# Patient Record
Sex: FEMALE | Race: WHITE | Employment: FULL TIME | URBAN - NONMETROPOLITAN AREA
[De-identification: names, ages, dates, MRNs, and addresses within clinical notes are randomized per-mention and may not be internally consistent; named-entity substitution may affect disease eponyms.]

---

## 2021-09-21 ENCOUNTER — HOSPITAL ENCOUNTER (EMERGENCY)
Age: 41
Discharge: HOME | End: 2021-09-21
Attending: EMERGENCY MEDICINE

## 2021-09-21 VITALS
WEIGHT: 176 LBS | BODY MASS INDEX: 34.55 KG/M2 | SYSTOLIC BLOOD PRESSURE: 136 MMHG | RESPIRATION RATE: 18 BRPM | TEMPERATURE: 98.3 F | DIASTOLIC BLOOD PRESSURE: 94 MMHG | HEIGHT: 60 IN | HEART RATE: 98 BPM | OXYGEN SATURATION: 99 %

## 2021-09-21 DIAGNOSIS — K04.7 DENTAL INFECTION: Primary | ICD-10-CM

## 2021-09-21 PROCEDURE — 99283 EMERGENCY DEPT VISIT LOW MDM: CPT

## 2021-09-21 RX ORDER — GLIMEPIRIDE 2 MG/1
TABLET ORAL
COMMUNITY
Start: 2021-09-14

## 2021-09-21 RX ORDER — ESCITALOPRAM OXALATE 10 MG/1
10 TABLET ORAL DAILY
COMMUNITY
Start: 2021-09-14

## 2021-09-21 RX ORDER — DOXEPIN HYDROCHLORIDE 50 MG/1
50 CAPSULE ORAL 2 TIMES DAILY
COMMUNITY
Start: 2021-09-14

## 2021-09-21 RX ORDER — CLINDAMYCIN HYDROCHLORIDE 300 MG/1
300 CAPSULE ORAL 3 TIMES DAILY
Qty: 21 CAPSULE | Refills: 0 | Status: SHIPPED | OUTPATIENT
Start: 2021-09-21 | End: 2021-09-28

## 2021-09-21 RX ORDER — CETIRIZINE HYDROCHLORIDE 10 MG/1
TABLET ORAL
COMMUNITY
Start: 2021-09-14

## 2021-09-21 RX ORDER — HYDROXYZINE 50 MG/1
TABLET, FILM COATED ORAL
COMMUNITY
Start: 2021-09-14

## 2021-09-21 RX ORDER — LISINOPRIL 20 MG/1
TABLET ORAL
COMMUNITY
Start: 2021-09-14

## 2021-09-21 RX ORDER — ARIPIPRAZOLE 5 MG/1
TABLET ORAL
COMMUNITY
Start: 2021-09-14

## 2021-09-21 ASSESSMENT — PAIN DESCRIPTION - PAIN TYPE: TYPE: ACUTE PAIN

## 2021-09-21 ASSESSMENT — ENCOUNTER SYMPTOMS
VOICE CHANGE: 0
FACIAL SWELLING: 1

## 2021-09-21 ASSESSMENT — PAIN SCALES - GENERAL: PAINLEVEL_OUTOF10: 4

## 2021-09-21 ASSESSMENT — PAIN DESCRIPTION - LOCATION: LOCATION: TEETH

## 2021-09-21 ASSESSMENT — PAIN DESCRIPTION - ORIENTATION: ORIENTATION: LEFT;LOWER

## 2021-09-21 NOTE — ED PROVIDER NOTES
Triage Chief Complaint:   Dental Pain (lower left dental pain x 3 days; sts broken molar; does not have dentist)    [place]:  Flakito Tsang is a 42 y.o. female that presents to the ED complaining of tooth pain is been present for about 72 hours. She broke a tooth she has full upper dentures. She does not have a local dentist.  She lives locally does not know where to go. No reported fever no exposure but with Covid.   She is having some cold intolerance and swelling to the left side of her face  HPI    Past Medical History:   Diagnosis Date    Bipolar affective ([address]     Full dentures     Full upper denture--partial lower denture    History of blood transfusion 5/2015    Anemia from prolonged menses    Hypertension     Type II or unspecified type diabetes mellitus without mention of complication, not stated as uncontrolled [facility])      Past Surgical History:   Procedure Laterality Date    DENTAL SURGERY  2007    Most of teeth extracted    DILATION AND CURETTAGE OF UTERUS  2/2015    HERNIA REPAIR  43/99/22    Umbilical     Family History   Problem Relation Age of Onset    High Blood Pressure Mother     Diabetes Father     Vision Loss Father     High Blood Pressure Father     Other Sister         Migraines     Social History     Socioeconomic History    Marital status:      Spouse name: Not on file    Number of children: Not on file    Years of education: Not on file    Highest education level: Not on file   Occupational History    Not on file   Tobacco Use    Smoking status: Never Smoker    Smokeless tobacco: Never Used   Substance and Sexual Activity    Alcohol use: Yes     Comment: occassionally    Drug use: No    Sexual activity: Yes     Partners: Male   Other Topics Concern    Not on file   Social History Narrative    Not on file     Social Determinants of Health     Financial Resource Strain:     Difficulty of Paying Living Expenses:    Food Insecurity:     Worried About Circumference       Peak Flow       Pain Score       Pain Loc       Pain Edu? Excl. in [address]? Physical Exam  Vitals and nursing note reviewed. Constitutional:       Appearance: She is well-developed. She is obese. She is ill-appearing. HENT:      Head: Normocephalic and atraumatic. Mouth/Throat:      Lips: Pink. Mouth: Mucous membranes are moist.      Dentition: Abnormal dentition. Has dentures. Dental tenderness, gingival swelling, dental caries and gum lesions present. Eyes:      Pupils: Pupils are equal, round, and reactive to light. Musculoskeletal:         General: Normal range of motion. Cervical back: Normal range of motion and neck supple. Skin:     General: Skin is warm and dry. Neurological:      Mental Status: She is alert and oriented to person, place, and time. I have reviewed and interpreted all of the currently available lab results from this visit (ifapplicable):  No results found for this visit on 09/21/21. Radiographs (if obtained):  [] The following radiograph wasinterpreted by myself in the absence of a radiologist:   [] Radiologist's Report Reviewed:  No orders to display         EKG (if obtained): (All EKG's are interpreted by myself in the absence of a cardiologist)    Chart review shows recent radiographs:  No results found. MDM:      Patient has severe dental disease caries gingivitis with probable periapical abscess that is not externally draining or bulging. She does not require an ideal starting clindamycin stat she is to call to be seen with the next 24 hours           Clinical Impression:  1. Dental infection      Disposition referral (if applicable):    Dentist        Disposition medications (if applicable):  New Prescriptions    CLINDAMYCIN (CLEOCIN) 300 MG CAPSULE    Take 1 capsule by mouth 3 times daily for 7 days           Al March DO, FACEP      Comment: Please note this report has been produced using speech recognition software and maycontain errors related to that system including errors in grammar, punctuation, and spelling, as well as words and phrases that may be inappropriate. If there are any questions or concerns please feel free to contact thedictating provider for clarification.         Jarred Durbin,   09/21/21 1046

## 2021-09-21 NOTE — ED NOTES
This nurse entered patients room to review discharge paperwork, patient was not in room, appears patient left without discharge instructions.      Chintan Delcid RN  09/21/21 6140

## 2021-11-14 ENCOUNTER — HOSPITAL ENCOUNTER (EMERGENCY)
Age: 41
Discharge: HOME | End: 2021-11-14
Attending: EMERGENCY MEDICINE
Payer: COMMERCIAL

## 2021-11-14 VITALS
HEART RATE: 111 BPM | TEMPERATURE: 97.6 F | HEIGHT: 60 IN | OXYGEN SATURATION: 98 % | DIASTOLIC BLOOD PRESSURE: 88 MMHG | SYSTOLIC BLOOD PRESSURE: 144 MMHG | RESPIRATION RATE: 18 BRPM | BODY MASS INDEX: 33.38 KG/M2 | WEIGHT: 170 LBS

## 2021-11-14 DIAGNOSIS — K04.7 DENTAL ABSCESS: Primary | ICD-10-CM

## 2021-11-14 PROCEDURE — 99283 EMERGENCY DEPT VISIT LOW MDM: CPT

## 2021-11-14 RX ORDER — IBUPROFEN 800 MG/1
800 TABLET ORAL EVERY 6 HOURS PRN
Qty: 30 TABLET | Refills: 0 | Status: SHIPPED | OUTPATIENT
Start: 2021-11-14 | End: 2021-12-10

## 2021-11-14 RX ORDER — CLINDAMYCIN HYDROCHLORIDE 300 MG/1
300 CAPSULE ORAL 4 TIMES DAILY
Qty: 40 CAPSULE | Refills: 0 | Status: SHIPPED | OUTPATIENT
Start: 2021-11-14 | End: 2021-11-24

## 2021-11-14 ASSESSMENT — PAIN DESCRIPTION - DESCRIPTORS: DESCRIPTORS: ACHING

## 2021-11-14 ASSESSMENT — PAIN DESCRIPTION - PAIN TYPE: TYPE: ACUTE PAIN

## 2021-11-14 ASSESSMENT — PAIN SCALES - GENERAL: PAINLEVEL_OUTOF10: 8

## 2021-11-14 ASSESSMENT — PAIN DESCRIPTION - LOCATION: LOCATION: JAW

## 2021-11-14 ASSESSMENT — PAIN DESCRIPTION - ORIENTATION: ORIENTATION: RIGHT;LOWER

## 2021-11-14 NOTE — ED PROVIDER NOTES
Emergency Department Encounter  Location: [address]    Patient: Delaney Mejia  MRN: [id number]  : 1980  Date of evaluation: 2021  ED Provider: Ike Vyas DO, FACEP    Chief Complaint:    Dental Pain (right lower jaw x 1 week )    [place]:  Delaney Mejia is a 42 y.o. female that presents to the emergency department with complaints of a dental abscess in her right lower jaw. The patient states that she has insurance now and is attempting to see the dental clinic at the [facility]. The patient was seen in September for similar problem. She states for the past week she has had some redness and swelling with pain in her right lower jaw. She states that when she was seen in September she tried to see a dentist but did not have insurance but has insurance now. She denies fever or chills. She denies any difficulty swallowing. She denies any other associated signs and symptoms. She has been using ibuprofen with minimal relief. ROS:  At least 4 systems reviewed and otherwise acutely negative except as in the [address]   Negative for fever or chills  Negative for chest pain  Negative for shortness of breath  Negative for nausea vomiting diarrhea or constipation    Past Medical History:   Diagnosis Date    Bipolar affective ([facility] [address]     Full dentures     Full upper denture--partial lower denture    History of blood transfusion 2015    Anemia from prolonged menses    Hypertension     Type II or unspecified type diabetes mellitus without mention of complication, not stated as uncontrolled      Past Surgical History:   Procedure Laterality Date    DENTAL SURGERY      Most of teeth extracted    DILATION AND CURETTAGE OF UTERUS  2015    HERNIA REPAIR  83    Umbilical     Family History   Problem Relation Age of Onset    High Blood Pressure Mother     Diabetes Father     Vision Loss Father     High Blood Pressure Father     Other Sister Migraines     Social History     Socioeconomic History    Marital status:      Spouse name: Not on file    Number of children: Not on file    Years of education: Not on file    Highest education level: Not on file   Occupational History    Not on file   Tobacco Use    Smoking status: Never Smoker    Smokeless tobacco: Never Used   Substance and Sexual Activity    Alcohol use: Yes     Comment: occassionally    Drug use: Yes     Types: Marijuana [place])    Sexual activity: Yes     Partners: Male   Other Topics Concern    Not on file   Social History Narrative    Not on file     Social Determinants of Health     Financial Resource Strain:     Difficulty of Paying Living Expenses: Not on file   Food Insecurity:     Worried About Running Out of Food in the Last Year: Not on file    Kody of Food in the Last Year: Not on file   Transportation Needs:     Lack of Transportation (Medical): Not on file    Lack of Transportation (Non-Medical): Not on file   Physical Activity:     Days of Exercise per Week: Not on file    Minutes of Exercise per Session: Not on file   Stress:     Feeling of Stress : Not on file   Social Connections:     Frequency of Communication with Friends and Family: Not on file    Frequency of Social Gatherings with Friends and Family: Not on file    Attends Anabaptism Services: Not on file    Active Member of [address] [place] [address] [employer] or Organizations: Not on file    Attends Club or Organization Meetings: Not on file    Marital Status: Not on file   Intimate Partner Violence:     Fear of Current or Ex-Partner: Not on file    Emotionally Abused: Not on file    Physically Abused: Not on file    Sexually Abused: Not on file   Housing Stability:     Unable to Pay for Housing in the Last Year: Not on file    Number of [place] in the Last Year: Not on file    Unstable Housing in the Last Year: Not on file     No current facility-administered medications for this encounter. Current Outpatient Medications   Medication Sig Dispense Refill    clindamycin (CLEOCIN) 300 MG capsule Take 1 capsule by mouth 4 times daily for 10 days 40 capsule 0    ibuprofen (ADVIL;MOTRIN) 800 MG tablet Take 1 tablet by mouth every 6 hours as needed for Pain 30 tablet 0    ARIPiprazole (ABILIFY) 5 MG tablet TAKE 1 TABLET BY MOUTH ONCE DAILY AT BEDTIME      cetirizine (ZYRTEC) 10 MG tablet TAKE 1 TABLET BY MOUTH ONCE DAILY      doxepin (SINEQUAN) 25 MG capsule TAKE 2 CAPSULES BY MOUTH EVERY DAY AT BEDTIME      escitalopram (LEXAPRO) 5 MG tablet TAKE 1 TABLET BY MOUTH ONCE DAILY      glimepiride (AMARYL) 2 MG tablet TAKE 1 TABLET BY MOUTH TWICE DAILY      hydrOXYzine (ATARAX) 50 MG tablet TAKE 2 TABLETS BY MOUTH AT BEDTIME      lisinopril (PRINIVIL;ZESTRIL) 20 MG tablet TAKE 1 TABLET BY MOUTH ONCE DAILY      metformin (GLUCOPHAGE) 500 MG tablet Take 1 tablet by mouth 2 times daily (with meals). 20 tablet 0     Allergies   Allergen Reactions    Mineral Oil Itching and Rash     Nursing Notes Reviewed    Physical Exam:  ED Triage Vitals [11/14/21 1503]   Enc Vitals Group      BP (!) 144/88      Pulse 111      Resp 18      Temp 97.6 °F (36.4 °C)      Temp Source Oral      SpO2 98 %      Weight 170 lb (77.1 kg)      Height 5' (1.524 m)      Head Circumference       Peak Flow       Pain Score       Pain Loc       Pain Edu? Excl. in [address]? GENERAL APPEARANCE: Awake and alert. Cooperative. No acute distress. Nontoxic in appearance  HEAD: Normocephalic. Atraumatic. EYES: Sclera anicteric. ENT: Tolerates saliva. Dentition is generally in poor repair with carious teeth present she does have swelling that is apparent externally along the angle of her jaw on the right. Upon examination of her oral cavity there is a carious tooth present in position #29. There is broken off and the pulp is exposed. She has what appears to be pulpitis associated with an apical abscess. NECK: Supple.  Trachea midline. LUNGS: Respirations unlabored. EXTREMITIES: No acute deformities. SKIN: Warm and dry. NEUROLOGICAL: No gross facial drooping. PSYCHIATRIC: Normal mood. Labs:  No results found for this visit on 11/14/21. Radiographs (if obtained):  [] The following radiograph was interpreted by myself in the absence of a radiologist:  [x] Radiologist's Report reviewed at time of ED visit:  No orders to display       ED Course and MDM:  Patient will be started on clindamycin and ibuprofen. I considered prednisone for this patient but she is diabetic and her blood sugars she states is normally around 170. She takes oral medication and is not able to perform a sliding scale so relative to that elected not to use prednisone. Patient was given the dental resource sheet and she will be discharged stable condition to follow-up with the dentist as soon as possible. Final Impression:  1.  Dental abscess      DISPOSITION Decision To Discharge    Patient referred to:  A dentist      As soon as possible    Discharge medications:  New Prescriptions    CLINDAMYCIN (CLEOCIN) 300 MG CAPSULE    Take 1 capsule by mouth 4 times daily for 10 days    IBUPROFEN (ADVIL;MOTRIN) 800 MG TABLET    Take 1 tablet by mouth every 6 hours as needed for Pain     (Please note that portions of this note may have been completed with a voice recognition program. Efforts were made to edit the dictations but occasionally words are mis-transcribed.)    Pebbles Carreno DO, [facility]  Board certified in [place], [address] [place] [address]  11/14/21 [address] [place] [address]

## 2021-12-10 ENCOUNTER — HOSPITAL ENCOUNTER (EMERGENCY)
Age: 41
Discharge: HOME | End: 2021-12-10
Payer: COMMERCIAL

## 2021-12-10 VITALS
RESPIRATION RATE: 18 BRPM | SYSTOLIC BLOOD PRESSURE: 137 MMHG | BODY MASS INDEX: 33.38 KG/M2 | HEART RATE: 115 BPM | TEMPERATURE: 97.5 F | DIASTOLIC BLOOD PRESSURE: 77 MMHG | WEIGHT: 170 LBS | HEIGHT: 60 IN | OXYGEN SATURATION: 98 %

## 2021-12-10 DIAGNOSIS — L03.211 FACIAL CELLULITIS: ICD-10-CM

## 2021-12-10 DIAGNOSIS — K02.9 DENTAL CARIES: ICD-10-CM

## 2021-12-10 DIAGNOSIS — K04.7 DENTAL INFECTION: Primary | ICD-10-CM

## 2021-12-10 PROCEDURE — 99282 EMERGENCY DEPT VISIT SF MDM: CPT

## 2021-12-10 RX ORDER — AMOXICILLIN AND CLAVULANATE POTASSIUM 875; 125 MG/1; MG/1
1 TABLET, FILM COATED ORAL 2 TIMES DAILY
Qty: 20 TABLET | Refills: 0 | Status: SHIPPED | OUTPATIENT
Start: 2021-12-10 | End: 2021-12-20

## 2021-12-10 RX ORDER — IBUPROFEN 600 MG/1
600 TABLET ORAL 3 TIMES DAILY PRN
Qty: 30 TABLET | Refills: 0 | Status: SHIPPED | OUTPATIENT
Start: 2021-12-10

## 2021-12-10 RX ORDER — ACETAMINOPHEN 325 MG/1
650 TABLET ORAL EVERY 6 HOURS PRN
Qty: 30 TABLET | Refills: 0 | Status: SHIPPED | OUTPATIENT
Start: 2021-12-10

## 2021-12-10 RX ORDER — AMOXICILLIN AND CLAVULANATE POTASSIUM 875; 125 MG/1; MG/1
1 TABLET, FILM COATED ORAL ONCE
Status: DISCONTINUED | OUTPATIENT
Start: 2021-12-10 | End: 2021-12-10 | Stop reason: HOSPADM

## 2021-12-10 NOTE — ED PROVIDER NOTES
CHIEF COMPLAINT  Chief Complaint   Patient presents with    Dental Pain     since yesterday       HPI  Ashvin Rodriguez is a 42 y.o. female with history of diabetes, partial denture on the lower, hypertension who presents 3 days of worsening right lower dental pain with swelling of the face for 1 day. She is also had fatigue. She denies measured fever. She denies vomiting. Denies difficulty swallowing, noisy breathing, pain or swelling underneath the tongue. She has swelling of the right lower face. She has area of fractured teeth in this region and plans on following with dentistry next Wednesday. Signs and symptoms moderate and persistent, no trauma to the area.       REVIEW OF SYSTEMS  Review of Systems   History obtained from chart review and the patient  General ROS: negative for - fever  Ophthalmic ROS: negative for - decreased vision or double vision  ENT ROS: positive for -dental pain, facial swelling  Hematological and Lymphatic ROS: negative for - bleeding problems or bruising  Endocrine ROS: negative for - unexpected weight changes  Respiratory ROS: no cough, shortness of breath, or wheezing  Cardiovascular ROS: no chest pain or dyspnea on exertion  Gastrointestinal ROS: no abdominal pain, change in bowel habits, or black or bloody stools  Genito-Urinary ROS: no dysuria, trouble voiding, or hematuria  Musculoskeletal ROS: negative for - joint stiffness or joint swelling  Neurological ROS: no TIA or stroke symptoms      PAST MEDICAL HISTORY  Past Medical History:   Diagnosis Date    B[address] affective ([facility] [address]     Full dentures     Full upper denture--partial lower denture    History of blood transfusion 5/2015    Anemia from prolonged menses    Hypertension     Type II or unspecified type diabetes mellitus without mention of complication, not stated as uncontrolled        FAMILY HISTORY  Family History   Problem Relation Age of Onset    High Blood Pressure Mother     Diabetes Father    Lawrence Muro Vision Loss Father     High Blood Pressure Father     Other Sister         Migraines       SOCIAL HISTORY  Social History     Socioeconomic History    Marital status:      Spouse name: None    Number of children: None    Years of education: None    Highest education level: None   Occupational History    None   Tobacco Use    Smoking status: Never Smoker    Smokeless tobacco: Never Used   Substance and Sexual Activity    Alcohol use: Yes     Comment: occassionally    Drug use: Yes     Types: Marijuana [place])    Sexual activity: Yes     Partners: Male   Other Topics Concern    None   Social History Narrative    None     Social Determinants of Health     Financial Resource Strain:     Difficulty of Paying Living Expenses: Not on file   Food Insecurity:     Worried About Running Out of Food in the Last Year: Not on file    Kody of Food in the Last Year: Not on file   Transportation Needs:     Lack of Transportation (Medical): Not on file    Lack of Transportation (Non-Medical):  Not on file   Physical Activity:     Days of Exercise per Week: Not on file    Minutes of Exercise per Session: Not on file   Stress:     Feeling of Stress : Not on file   Social Connections:     Frequency of Communication with Friends and Family: Not on file    Frequency of Social Gatherings with Friends and Family: Not on file    Attends Mandaeism Services: Not on file    Active Member of [employer] or Organizations: Not on file    Attends Club or Organization Meetings: Not on file    Marital Status: Not on file   Intimate Partner Violence:     Fear of Current or Ex-Partner: Not on file    Emotionally Abused: Not on file    Physically Abused: Not on file    Sexually Abused: Not on file   Housing Stability:     Unable to Pay for Housing in the Last Year: Not on file    Number of [place] in the Last Year: Not on file    Unstable Housing in the Last Year: Not on file       SURGICAL HISTORY  Past Surgical History:   Procedure Laterality Date    DENTAL SURGERY  2007    Most of teeth extracted    DILATION AND CURETTAGE OF UTERUS  2/2015    HERNIA REPAIR  11/00/53    Umbilical       CURRENT MEDICATIONS  No current facility-administered medications on file prior to encounter. Current Outpatient Medications on File Prior to Encounter   Medication Sig Dispense Refill    ARIPiprazole (ABILIFY) 5 MG tablet TAKE 1 TABLET BY MOUTH ONCE DAILY AT BEDTIME      cetirizine (ZYRTEC) 10 MG tablet TAKE 1 TABLET BY MOUTH ONCE DAILY      doxepin (SINEQUAN) 25 MG capsule TAKE 2 CAPSULES BY MOUTH EVERY DAY AT BEDTIME      escitalopram (LEXAPRO) 5 MG tablet TAKE 1 TABLET BY MOUTH ONCE DAILY      glimepiride (AMARYL) 2 MG tablet TAKE 1 TABLET BY MOUTH TWICE DAILY      hydrOXYzine (ATARAX) 50 MG tablet TAKE 2 TABLETS BY MOUTH AT BEDTIME      lisinopril (PRINIVIL;ZESTRIL) 20 MG tablet TAKE 1 TABLET BY MOUTH ONCE DAILY      metformin (GLUCOPHAGE) 500 MG tablet Take 1 tablet by mouth 2 times daily (with meals). 20 tablet 0         ALLERGIES  Allergies   Allergen Reactions    Mineral Oil Itching and Rash       PHYSICAL EXAM  VITAL SIGNS: /77   Pulse 115   Temp 97.5 °F (36.4 °C) (Oral)   Resp 18   Ht 5' (1.524 m)   Wt 170 lb (77.1 kg)   SpO2 98%   BMI 33.20 kg/m²   Constitutional: Well developed, Well nourished, resting in chair, appears uncomfortable  HENT: Normocephalic, Atraumatic, Bilateral external ears normal, Oropharynx moist, No oral exudates, Nose normal.  Swelling of the right lower face lateral to the jaw. Gingival induration without any fluctuance, fractured teeth. No sublingual or submandibular fullness or induration. Swallows without difficulty. TM intact right without any Dimitri Sam, mastoiditis or otitis externa. Eyes: PERRL, EOMI, Conjunctiva normal, No discharge. Neck: Normal range of motion, Supple, No stridor.    Cardiovascular: Mild tachycardia heart rate, Normal rhythm, No murmurs, No rubs, No gallops. Thorax & Lungs: Normal breath sounds, No respiratory distress, No wheezing, No chest tenderness. Abdomen: Bowel sounds normal, Soft, No tenderness, no guarding, no rebound, No masses, No pulsatile masses. Skin: Warm, Dry, No erythema, No rash. Extremities: Intact distal pulses, No edema, No tenderness, No cyanosis, No clubbing. Musculoskeletal: Good gross range of motion in all major joints. No major deformities noted. Neurologic: Alert & oriented x 3, Normal gross motor function, Normal gross sensory function, No focal deficits noted. Psychiatric: Affect normal        RADIOLOGY/PROCEDURES/LABS      Medications   amoxicillin-clavulanate (AUGMENTIN) 875-125 MG per tablet 1 tablet (has no administration in time range)       COURSE & MEDICAL DECISION MAKING  Pertinent Labs & Imaging studies reviewed. (See chart for details)    40-year-old female presents with dental fracture, dental carry with associated facial cellulitis. She will be started on Augmentin. She is not having significant pain here but does have pressure and swelling. There is not signs of Nilesh's angina, there is no drainable abscess at this time, is more consistent with induration. Patient's blood sugars have been in the 160s 170s, she is agreeable to watch these closely. She will be started on 10 days of Augmentin, referred back to dentistry, strict return precautions put in to place. Patient agreeable plan of care. \  FINAL IMPRESSION  Problem List Items Addressed This Visit     None      Visit Diagnoses     Dental infection    -  Primary    Dental caries        Facial cellulitis          1.    2.   3.    Patient gave me permission to discuss medical history, care, and plan with those present in the room.   Electronically signed by: Jhonny Tran MD, 12/10/2021  MD Jhonny Florentino MD  12/10/21 5862

## 2022-03-08 ENCOUNTER — HOSPITAL ENCOUNTER (EMERGENCY)
Age: 42
Discharge: PSYCHIATRIC OR SUBSTANCE USE FACILITY | End: 2022-03-09
Attending: EMERGENCY MEDICINE
Payer: COMMERCIAL

## 2022-03-08 DIAGNOSIS — R45.851 SUICIDAL IDEATION: Primary | ICD-10-CM

## 2022-03-08 LAB
ALBUMIN SERPL-MCNC: 4.3 GM/DL (ref 3.4–5)
ALCOHOL SCREEN SERUM: <0.01 %WT/VOL
ALP BLD-CCNC: 121 IU/L (ref 40–129)
ALT SERPL-CCNC: 50 U/L (ref 10–40)
AMPHETAMINES: NEGATIVE
ANION GAP SERPL CALCULATED.3IONS-SCNC: 11 MMOL/L (ref 4–16)
AST SERPL-CCNC: 29 IU/L (ref 15–37)
BACTERIA: ABNORMAL /HPF
BARBITURATE SCREEN URINE: NEGATIVE
BASOPHILS ABSOLUTE: 0 K/CU MM
BASOPHILS RELATIVE PERCENT: 0.3 % (ref 0–1)
BENZODIAZEPINE SCREEN, URINE: NEGATIVE
BILIRUB SERPL-MCNC: 0.5 MG/DL (ref 0–1)
BILIRUBIN URINE: NEGATIVE MG/DL
BLOOD, URINE: NEGATIVE
BUN BLDV-MCNC: 9 MG/DL (ref 6–23)
CALCIUM SERPL-MCNC: 8.8 MG/DL (ref 8.3–10.6)
CANNABINOID SCREEN URINE: NEGATIVE
CAST TYPE: ABNORMAL /HPF
CHLORIDE BLD-SCNC: 100 MMOL/L (ref 99–110)
CLARITY: CLEAR
CO2: 20 MMOL/L (ref 21–32)
COCAINE METABOLITE: NEGATIVE
COLOR: YELLOW
CREAT SERPL-MCNC: 0.6 MG/DL (ref 0.6–1.1)
CRYSTAL TYPE: NEGATIVE /HPF
DIFFERENTIAL TYPE: ABNORMAL
EOSINOPHILS ABSOLUTE: 0.1 K/CU MM
EOSINOPHILS RELATIVE PERCENT: 1 % (ref 0–3)
EPITHELIAL CELLS, UA: ABNORMAL /HPF
GFR AFRICAN AMERICAN: >60 ML/MIN/1.73M2
GFR NON-AFRICAN AMERICAN: >60 ML/MIN/1.73M2
GLUCOSE BLD-MCNC: 273 MG/DL (ref 70–99)
GLUCOSE, URINE: >1000 MG/DL
HCT VFR BLD CALC: 40.3 % (ref 37–47)
HEMOGLOBIN: 14.3 GM/DL (ref 12.5–16)
IMMATURE NEUTROPHIL %: 0.3 % (ref 0–0.43)
INTERPRETATION: NORMAL
KETONES, URINE: NEGATIVE MG/DL
LEUKOCYTE ESTERASE, URINE: NEGATIVE
LYMPHOCYTES ABSOLUTE: 1.6 K/CU MM
LYMPHOCYTES RELATIVE PERCENT: 21.4 % (ref 24–44)
MCH RBC QN AUTO: 28 PG (ref 27–31)
MCHC RBC AUTO-ENTMCNC: 35.5 % (ref 32–36)
MCV RBC AUTO: 78.9 FL (ref 78–100)
MONOCYTES ABSOLUTE: 0.5 K/CU MM
MONOCYTES RELATIVE PERCENT: 7.4 % (ref 0–4)
NITRITE URINE, QUANTITATIVE: NEGATIVE
OPIATES, URINE: NEGATIVE
OXYCODONE: NEGATIVE
PDW BLD-RTO: 12.7 % (ref 11.7–14.9)
PH, URINE: 5.5 (ref 5–8)
PHENCYCLIDINE, URINE: NEGATIVE
PLATELET # BLD: 150 K/CU MM (ref 140–440)
PMV BLD AUTO: 10.7 FL (ref 7.5–11.1)
POTASSIUM SERPL-SCNC: 3.9 MMOL/L (ref 3.5–5.1)
PREGNANCY, URINE: NEGATIVE
PROTEIN UA: ABNORMAL MG/DL
RBC # BLD: 5.11 M/CU MM (ref 4.2–5.4)
RBC URINE: ABNORMAL /HPF (ref 0–6)
SARS-COV-2, NAAT: NOT DETECTED
SEGMENTED NEUTROPHILS ABSOLUTE COUNT: 5.1 K/CU MM
SEGMENTED NEUTROPHILS RELATIVE PERCENT: 69.6 % (ref 36–66)
SODIUM BLD-SCNC: 131 MMOL/L (ref 135–145)
SOURCE: NORMAL
SPECIFIC GRAVITY UA: 1.02 (ref 1–1.03)
SPECIFIC GRAVITY, URINE: 1.02 (ref 1–1.03)
TOTAL IMMATURE NEUTOROPHIL: 0.02 K/CU MM
TOTAL PROTEIN: 7.5 GM/DL (ref 6.4–8.2)
UROBILINOGEN, URINE: 0.2 MG/DL (ref 0.2–1)
WBC # BLD: 7.3 K/CU MM (ref 4–10.5)
WBC UA: ABNORMAL /HPF (ref 0–5)

## 2022-03-08 PROCEDURE — 85025 COMPLETE CBC W/AUTO DIFF WBC: CPT

## 2022-03-08 PROCEDURE — 81025 URINE PREGNANCY TEST: CPT

## 2022-03-08 PROCEDURE — G0480 DRUG TEST DEF 1-7 CLASSES: HCPCS

## 2022-03-08 PROCEDURE — 99285 EMERGENCY DEPT VISIT HI MDM: CPT

## 2022-03-08 PROCEDURE — 99205 OFFICE O/P NEW HI 60 MIN: CPT | Performed by: PSYCHIATRY & NEUROLOGY

## 2022-03-08 PROCEDURE — 87635 SARS-COV-2 COVID-19 AMP PRB: CPT

## 2022-03-08 PROCEDURE — 81001 URINALYSIS AUTO W/SCOPE: CPT

## 2022-03-08 PROCEDURE — 6370000000 HC RX 637 (ALT 250 FOR IP): Performed by: EMERGENCY MEDICINE

## 2022-03-08 PROCEDURE — 80053 COMPREHEN METABOLIC PANEL: CPT

## 2022-03-08 PROCEDURE — 80307 DRUG TEST PRSMV CHEM ANLYZR: CPT

## 2022-03-08 RX ORDER — ZIPRASIDONE HYDROCHLORIDE 20 MG/1
20 CAPSULE ORAL 2 TIMES DAILY WITH MEALS
COMMUNITY

## 2022-03-08 RX ORDER — LISINOPRIL 20 MG/1
20 TABLET ORAL ONCE
Status: COMPLETED | OUTPATIENT
Start: 2022-03-08 | End: 2022-03-08

## 2022-03-08 RX ADMIN — LISINOPRIL 20 MG: 20 TABLET ORAL at 16:28

## 2022-03-08 NOTE — ED PROVIDER NOTES
Triage Chief Complaint:   Suicidal    [place]:  Maya Rothman is a 36 y.o. female that presents presents to the ED with symptoms that she is concerned she is having acute panic attack. Patient is suicidal with a plan to crash her car. She is  working to support a self with much difficulty she is having a challenging at work she has no access to handguns. She has attempted suicide in the past and has been hospitalized at [facility] a year ago. She used to be a cutter but has not done that recently. She does have a stepdaughter but she does not live with her. There is limited social support. The patient feels like she is at the end of the rope and needs to be hospitalized. She has been off her meds that have been filled for the [facility] consist of Abilify Lexapro Lamictal.  She is not taking those.         Past Medical History:   Diagnosis Date    Bipolar affective ([address]     Full dentures     Full upper denture--partial lower denture    History of blood transfusion 5/2015    Anemia from prolonged menses    Hypertension     Type II or unspecified type diabetes mellitus without mention of complication, not stated as uncontrolled      Past Surgical History:   Procedure Laterality Date    DENTAL SURGERY  2007    Most of teeth extracted    DILATION AND CURETTAGE OF UTERUS  2/2015    HERNIA REPAIR  29/14/87    Umbilical     Family History   Problem Relation Age of Onset    High Blood Pressure Mother     Diabetes Father     Vision Loss Father     High Blood Pressure Father     Other Sister         Migraines     Social History     Socioeconomic History    Marital status:      Spouse name: Not on file    Number of children: Not on file    Years of education: Not on file    Highest education level: Not on file   Occupational History    Not on file   Tobacco Use    Smoking status: Never Smoker    Smokeless tobacco: Never Used   Vaping Use    Vaping Use: Never used Substance and Sexual Activity    Alcohol use: Yes     Comment: occassionally    Drug use: Not Currently     Types: Marijuana Dolan Meckel)    Sexual activity: Yes     Partners: Male   Other Topics Concern    Not on file   Social History Narrative    Not on file     Social Determinants of Health     Financial Resource Strain:     Difficulty of Paying Living Expenses: Not on file   Food Insecurity:     Worried About Running Out of Food in the Last Year: Not on file    Kody of Food in the Last Year: Not on file   Transportation Needs:     Lack of Transportation (Medical): Not on file    Lack of Transportation (Non-Medical): Not on file   Physical Activity:     Days of Exercise per Week: Not on file    Minutes of Exercise per Session: Not on file   Stress:     Feeling of Stress : Not on file   Social Connections:     Frequency of Communication with Friends and Family: Not on file    Frequency of Social Gatherings with Friends and Family: Not on file    Attends [employer]ism Services: Not on file    Active Member of [address] [place] [address] [employer] or Organizations: Not on file    Attends Club or Organization Meetings: Not on file    Marital Status: Not on file   Intimate Partner Violence:     Fear of Current or Ex-Partner: Not on file    Emotionally Abused: Not on file    Physically Abused: Not on file    Sexually Abused: Not on file   Housing Stability:     Unable to Pay for Housing in the Last Year: Not on file    Number of [place] in the Last Year: Not on file    Unstable Housing in the Last Year: Not on file     No current facility-administered medications for this encounter.      Current Outpatient Medications   Medication Sig Dispense Refill    ziprasidone (GEODON) 20 MG capsule Take 20 mg by mouth 2 times daily (with meals)      acetaminophen (AMINOFEN) 325 MG tablet Take 2 tablets by mouth every 6 hours as needed for Pain 30 tablet 0    ibuprofen (ADVIL;MOTRIN) 600 MG tablet Take 1 tablet by mouth 3 times daily as needed for Pain 30 tablet 0    ARIPiprazole (ABILIFY) 5 MG tablet TAKE 1 TABLET BY MOUTH ONCE DAILY AT BEDTIME      cetirizine (ZYRTEC) 10 MG tablet TAKE 1 TABLET BY MOUTH ONCE DAILY      doxepin (SINEQUAN) 50 MG capsule Take 50 mg by mouth 2 times daily       escitalopram (LEXAPRO) 10 MG tablet Take 10 mg by mouth daily       glimepiride (AMARYL) 2 MG tablet TAKE 1 TABLET BY MOUTH TWICE DAILY      hydrOXYzine (ATARAX) 50 MG tablet TAKE 2 TABLETS BY MOUTH AT BEDTIME      lisinopril (PRINIVIL;ZESTRIL) 20 MG tablet TAKE 1 TABLET BY MOUTH ONCE DAILY      metformin (GLUCOPHAGE) 500 MG tablet Take 1 tablet by mouth 2 times daily (with meals). 20 tablet 0     Allergies   Allergen Reactions    Mineral Oil Itching and Rash         ROS:    Review of Systems   Psychiatric/Behavioral: Positive for dysphoric mood, sleep disturbance and suicidal ideas. All other systems reviewed and are negative. Nursing Notes Reviewed    Physical Exam:      ED Triage Vitals [03/08/22 1602]   Enc Vitals Group      BP (!) 142/105      Pulse 86      Resp 18      Temp 98.1 °F (36.7 °C)      Temp Source Oral      SpO2 98 %      Weight 180 lb (81.6 kg)      Height 5' (1.524 m)      Head Circumference       Peak Flow       Pain Score       Pain Loc       Pain Edu? Excl. in [address]? Physical Exam  Vitals and nursing note reviewed. Exam conducted with a chaperone present. Constitutional:       Appearance: She is well-developed. She is obese. HENT:      Head: Normocephalic and atraumatic. Right Ear: External ear normal.      Left Ear: External ear normal.   Eyes:      General: No scleral icterus. Right eye: No discharge. Left eye: No discharge. Conjunctiva/sclera: Conjunctivae normal.      Pupils: Pupils are equal, round, and reactive to light. Neck:      Thyroid: No thyromegaly. Vascular: No JVD. Trachea: No tracheal deviation.    Cardiovascular:      Rate and Rhythm: Normal rate and radiographs:  No results found. MDM:      Patient presents ED with a suicidal plan to crash in a car. The patient was seen tearful profoundly depressed warrants emergent psychiatric hospitalization. She is medically cleared and pending placement        ED Course as of 03/08/22 1735   Tue Mar 08, 2022   1627 Case discussed with psychiatry Dr. Belle Masters. He agrees the patient needs to be admitted he recommends contacting [phone] [facility] access to initiate the process [PW]      ED Course User Index  [PW] Josep Ramos DO         Clinical Impression:  1. Suicidal ideation      Disposition referral (if applicable):  No follow-up provider specified. Disposition medications (if applicable):  New Prescriptions    No medications on file           Al March DO, FACEP      Comment: Please note this report has been produced using speech recognition software and maycontain errors related to that system including errors in grammar, punctuation, and spelling, as well as words and phrases that may be inappropriate. If there are any questions or concerns please feel free to contact thedictating provider for clarification.         Josep Ramos DO  03/08/22 9207
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
## 2022-03-09 VITALS
DIASTOLIC BLOOD PRESSURE: 88 MMHG | BODY MASS INDEX: 35.34 KG/M2 | HEIGHT: 60 IN | RESPIRATION RATE: 18 BRPM | TEMPERATURE: 98.4 F | HEART RATE: 65 BPM | SYSTOLIC BLOOD PRESSURE: 126 MMHG | OXYGEN SATURATION: 97 % | WEIGHT: 180 LBS

## 2022-03-09 NOTE — ED NOTES
This tech took over sitting bedside with patient. Patient is resting in bed, no complaints of discomfort.           Golden  03/09/22 8419

## 2022-03-09 NOTE — ED PROVIDER NOTES
ADDENDUM:    Care of the patient was assumed  from Dr. Piter Ferguson. I have reviewed the notes, assessments, and/or procedures performed, I concur with her/his documentation on [address]. I reviewed the medical record and evaluated the patient. ED COURSE/MDM:  Laboratory and imaging data were reviewed and care plan was arranged with the patient(see separate lab/imaging reports). RADIOLOGY:  Already resulted studies have been reviewed. No orders to display       Labs Reviewed   CBC WITH AUTO DIFFERENTIAL - Abnormal; Notable for the following components:       Result Value    Segs Relative 69.6 (*)     Lymphocytes % 21.4 (*)     Monocytes % 7.4 (*)     All other components within normal limits   COMPREHENSIVE METABOLIC PANEL W/ REFLEX TO MG FOR LOW K - Abnormal; Notable for the following components:    Sodium 131 (*)     CO2 20 (*)     Glucose 273 (*)     ALT 50 (*)     All other components within normal limits   URINALYSIS WITH MICROSCOPIC - Abnormal; Notable for the following components:    Glucose, Urine >1,000 (*)     Protein, UA TRACE (*)     Bacteria, UA RARE (*)     All other components within normal limits   COVID-19, RAPID   PREGNANCY, URINE   ETHANOL   URINE DRUG SCREEN       Medications   lisinopril (PRINIVIL;ZESTRIL) tablet 20 mg (20 mg Oral Given 3/8/22 1628)       Vitals:    03/08/22 1602   BP: (!) 142/105   Pulse: 86   Resp: 18   Temp: 98.1 °F (36.7 °C)   TempSrc: Oral   SpO2: 98%   Weight: 180 lb (81.6 kg)   Height: 5' (1.524 m)       Patient accepted and going to [facility] 32:  1.  Suicidal ideation        New Prescriptions    No medications on file                 Marcia Davis DO  03/09/22 5569

## 2023-08-02 ENCOUNTER — TELEPHONE (OUTPATIENT)
Dept: CARDIOLOGY CLINIC | Age: 43
End: 2023-08-02

## 2023-08-23 ENCOUNTER — HOSPITAL ENCOUNTER (EMERGENCY)
Age: 43
Discharge: HOME | End: 2023-08-23
Attending: EMERGENCY MEDICINE

## 2023-08-23 VITALS
TEMPERATURE: 98.6 F | WEIGHT: 150 LBS | DIASTOLIC BLOOD PRESSURE: 86 MMHG | BODY MASS INDEX: 29.45 KG/M2 | OXYGEN SATURATION: 97 % | HEART RATE: 100 BPM | HEIGHT: 60 IN | RESPIRATION RATE: 18 BRPM | SYSTOLIC BLOOD PRESSURE: 137 MMHG

## 2023-08-23 DIAGNOSIS — L02.91 ABSCESS: Primary | ICD-10-CM

## 2023-08-23 PROCEDURE — 10060 I&D ABSCESS SIMPLE/SINGLE: CPT

## 2023-08-23 PROCEDURE — 99283 EMERGENCY DEPT VISIT LOW MDM: CPT

## 2023-08-23 RX ORDER — SULFAMETHOXAZOLE AND TRIMETHOPRIM 800; 160 MG/1; MG/1
1 TABLET ORAL 2 TIMES DAILY
Qty: 10 TABLET | Refills: 0 | Status: SHIPPED | OUTPATIENT
Start: 2023-08-23 | End: 2023-08-28

## 2023-08-23 ASSESSMENT — PAIN - FUNCTIONAL ASSESSMENT: PAIN_FUNCTIONAL_ASSESSMENT: 0-10

## 2023-08-23 ASSESSMENT — PAIN SCALES - GENERAL: PAINLEVEL_OUTOF10: 9

## 2023-08-24 NOTE — DISCHARGE INSTRUCTIONS
Drink lots of water. Tylenol 1g (2 extra strength tabs) and 600mg Motrin (aka ibuprofen) every 8 hours for pain. Antibiotics as we discussed. Follow up with your doctor in 2 days for reevaluation. Return for worsening or concerning symptoms.

## 2023-08-28 NOTE — ED PROVIDER NOTES
CC: abscess  Seen in room 8    HPI: This is a 39 y.o. female with past medical history of DM who comes for evaluation of abscess in the scalp in the back of her head. No fevers or chills. No neck pain. Notes she has had pain in her scalp on the top of her head and wanted to get checked out. Nursing Notes Reviewed    Past Medical History:   Diagnosis Date    Bipolar affective ([address])     Full dentures     Full upper denture--partial lower denture    History of blood transfusion 5/2015    Anemia from prolonged menses    Hypertension     Type II or unspecified type diabetes mellitus without mention of complication, not stated as uncontrolled      Past Surgical History:   Procedure Laterality Date    DENTAL SURGERY  2007    Most of teeth extracted    DILATION AND CURETTAGE OF UTERUS  2/2015    HERNIA REPAIR  15/67/69    Umbilical     Social History     Socioeconomic History    Marital status:      Spouse name: Not on file    Number of children: Not on file    Years of education: Not on file    Highest education level: Not on file   Occupational History    Not on file   Tobacco Use    Smoking status: Never    Smokeless tobacco: Never   Vaping Use    Vaping Use: Never used   Substance and Sexual Activity    Alcohol use: Yes     Comment: occassionally    Drug use: Not Currently     Types: Marijuana Ariadna Rival)    Sexual activity: Yes     Partners: Male   Other Topics Concern    Not on file   Social History Narrative    Not on file     Social Determinants of Health     Financial Resource Strain: Not on file   Food Insecurity: Not on file   Transportation Needs: Not on file   Physical Activity: Not on file   Stress: Not on file   Social Connections: Not on file   Intimate Partner Violence: Not on file   Housing Stability: Not on file     Allergies   Allergen Reactions    Mineral Oil Itching and Rash       Physical exam:  Vitals: per triage note  General: awake, alert. No acute distress. Skin: No rashes noted.   HENT:

## 2023-09-26 ENCOUNTER — HOSPITAL ENCOUNTER (EMERGENCY)
Age: 43
Discharge: HOME | End: 2023-09-26
Attending: EMERGENCY MEDICINE
Payer: COMMERCIAL

## 2023-09-26 VITALS
BODY MASS INDEX: 29.45 KG/M2 | RESPIRATION RATE: 16 BRPM | HEIGHT: 60 IN | OXYGEN SATURATION: 100 % | SYSTOLIC BLOOD PRESSURE: 160 MMHG | HEART RATE: 80 BPM | DIASTOLIC BLOOD PRESSURE: 103 MMHG | TEMPERATURE: 97.9 F | WEIGHT: 150 LBS

## 2023-09-26 DIAGNOSIS — K04.7 DENTAL INFECTION: Primary | ICD-10-CM

## 2023-09-26 PROCEDURE — 99283 EMERGENCY DEPT VISIT LOW MDM: CPT

## 2023-09-26 RX ORDER — AMOXICILLIN 500 MG/1
500 CAPSULE ORAL 3 TIMES DAILY
Qty: 21 CAPSULE | Refills: 0 | Status: SHIPPED | OUTPATIENT
Start: 2023-09-26 | End: 2023-10-03

## 2023-09-26 ASSESSMENT — LIFESTYLE VARIABLES
HOW MANY STANDARD DRINKS CONTAINING ALCOHOL DO YOU HAVE ON A TYPICAL DAY: 1 OR 2
HOW OFTEN DO YOU HAVE A DRINK CONTAINING ALCOHOL: MONTHLY OR LESS

## 2023-09-26 ASSESSMENT — PAIN DESCRIPTION - LOCATION: LOCATION: MOUTH

## 2023-09-26 ASSESSMENT — PAIN SCALES - GENERAL: PAINLEVEL_OUTOF10: 7

## 2023-09-26 ASSESSMENT — PAIN - FUNCTIONAL ASSESSMENT: PAIN_FUNCTIONAL_ASSESSMENT: 0-10

## 2023-09-26 ASSESSMENT — ENCOUNTER SYMPTOMS: FACIAL SWELLING: 0

## 2023-09-26 ASSESSMENT — PAIN DESCRIPTION - ORIENTATION: ORIENTATION: LEFT;LOWER

## 2023-09-26 ASSESSMENT — PAIN DESCRIPTION - DESCRIPTORS: DESCRIPTORS: BURNING

## 2023-09-26 NOTE — ED PROVIDER NOTES
Triage Chief Complaint:   Dental Pain (C/o lower right dental pain since last Friday. Patient states she has a dentist appt next Thursday. Patient has taken advil for pain but no relief.)    GERARD Scanlon is a 39 y.o. female that presents to the ED with complaint dental pain she had complete extraction to her maxillary dentition but is having pain on the inner aspect of the lower mandibular region. Is been present for several days she states she is waiting to have dental implants pain began on Friday she has been taking ibuprofen.   No fever no chills some cold intolerance denies any drainage or bleeding        Past Medical History:   Diagnosis Date    Bipolar affective ([address])     Full dentures     Full upper denture--partial lower denture    History of blood transfusion 5/2015    Anemia from prolonged menses    Hypertension     Type II or unspecified type diabetes mellitus without mention of complication, not stated as uncontrolled      Past Surgical History:   Procedure Laterality Date    DENTAL SURGERY  2007    Most of teeth extracted    DILATION AND CURETTAGE OF UTERUS  2/2015    HERNIA REPAIR  03/77/64    Umbilical     Family History   Problem Relation Age of Onset    High Blood Pressure Mother     Diabetes Father     Vision Loss Father     High Blood Pressure Father     Other Sister         Migraines     Social History     Socioeconomic History    Marital status:      Spouse name: Not on file    Number of children: Not on file    Years of education: Not on file    Highest education level: Not on file   Occupational History    Not on file   Tobacco Use    Smoking status: Never    Smokeless tobacco: Never   Vaping Use    Vaping Use: Never used   Substance and Sexual Activity    Alcohol use: Yes     Comment: occassionally    Drug use: Not Currently     Types: Marijuana Melodie Key)    Sexual activity: Yes     Partners: Male   Other Topics Concern    Not on file   Social History Narrative    Not on

## 2023-09-26 NOTE — ED NOTES
Patient discharging home, AVS reviewed with no questions at this time. Patient instrcuted to follow up per discharge instructions and to return for worsening symptoms. Respirations equal and unlabored, skin PWD.        Ruby Maher RN  09/26/23 1720

## 2023-11-04 ENCOUNTER — HOSPITAL ENCOUNTER (EMERGENCY)
Age: 43
Discharge: HOME | End: 2023-11-05
Attending: EMERGENCY MEDICINE
Payer: COMMERCIAL

## 2023-11-04 VITALS
OXYGEN SATURATION: 100 % | HEIGHT: 60 IN | SYSTOLIC BLOOD PRESSURE: 140 MMHG | WEIGHT: 160 LBS | RESPIRATION RATE: 12 BRPM | DIASTOLIC BLOOD PRESSURE: 100 MMHG | TEMPERATURE: 98.8 F | HEART RATE: 99 BPM | BODY MASS INDEX: 31.41 KG/M2

## 2023-11-04 DIAGNOSIS — L03.811 CELLULITIS OF HEAD EXCEPT FACE: ICD-10-CM

## 2023-11-04 DIAGNOSIS — L02.91 ABSCESS: Primary | ICD-10-CM

## 2023-11-04 PROCEDURE — 96365 THER/PROPH/DIAG IV INF INIT: CPT

## 2023-11-04 PROCEDURE — 99283 EMERGENCY DEPT VISIT LOW MDM: CPT

## 2023-11-04 PROCEDURE — 96375 TX/PRO/DX INJ NEW DRUG ADDON: CPT

## 2023-11-04 RX ORDER — CEPHALEXIN 500 MG/1
500 CAPSULE ORAL 4 TIMES DAILY
Qty: 40 CAPSULE | Refills: 0 | Status: SHIPPED | OUTPATIENT
Start: 2023-11-04 | End: 2023-11-14

## 2023-11-04 RX ORDER — CEPHALEXIN 500 MG/1
500 CAPSULE ORAL ONCE
Status: COMPLETED | OUTPATIENT
Start: 2023-11-05 | End: 2023-11-05

## 2023-11-04 RX ORDER — LIDOCAINE HYDROCHLORIDE 10 MG/ML
10 INJECTION, SOLUTION EPIDURAL; INFILTRATION; INTRACAUDAL; PERINEURAL ONCE
Status: DISCONTINUED | OUTPATIENT
Start: 2023-11-04 | End: 2023-11-05 | Stop reason: HOSPADM

## 2023-11-04 RX ORDER — BACITRACIN ZINC 500 [USP'U]/G
OINTMENT TOPICAL ONCE
Status: COMPLETED | OUTPATIENT
Start: 2023-11-05 | End: 2023-11-05

## 2023-11-04 ASSESSMENT — PAIN SCALES - GENERAL: PAINLEVEL_OUTOF10: 9

## 2023-11-04 ASSESSMENT — PAIN - FUNCTIONAL ASSESSMENT: PAIN_FUNCTIONAL_ASSESSMENT: 0-10

## 2023-11-04 ASSESSMENT — PAIN DESCRIPTION - LOCATION: LOCATION: HEAD

## 2023-11-05 VITALS
BODY MASS INDEX: 30.23 KG/M2 | OXYGEN SATURATION: 98 % | HEIGHT: 60 IN | HEART RATE: 101 BPM | RESPIRATION RATE: 14 BRPM | DIASTOLIC BLOOD PRESSURE: 104 MMHG | WEIGHT: 154 LBS | TEMPERATURE: 97.6 F | SYSTOLIC BLOOD PRESSURE: 139 MMHG

## 2023-11-05 DIAGNOSIS — E86.0 DEHYDRATION: ICD-10-CM

## 2023-11-05 DIAGNOSIS — Z51.89 WOUND CHECK, ABSCESS: Primary | ICD-10-CM

## 2023-11-05 DIAGNOSIS — E87.6 HYPOKALEMIA: ICD-10-CM

## 2023-11-05 LAB
ANION GAP SERPL CALCULATED.3IONS-SCNC: 16 MMOL/L (ref 4–16)
BASOPHILS ABSOLUTE: 0.1 K/CU MM
BASOPHILS RELATIVE PERCENT: 0.5 % (ref 0–1)
BUN SERPL-MCNC: 6 MG/DL (ref 6–23)
CALCIUM SERPL-MCNC: 9.1 MG/DL (ref 8.3–10.6)
CHLORIDE BLD-SCNC: 95 MMOL/L (ref 99–110)
CO2: 21 MMOL/L (ref 21–32)
CREAT SERPL-MCNC: 0.5 MG/DL (ref 0.6–1.1)
DIFFERENTIAL TYPE: ABNORMAL
EOSINOPHILS ABSOLUTE: 0.1 K/CU MM
EOSINOPHILS RELATIVE PERCENT: 1.2 % (ref 0–3)
GFR SERPL CREATININE-BSD FRML MDRD: >60 ML/MIN/1.73M2
GLUCOSE SERPL-MCNC: 345 MG/DL (ref 70–99)
HCT VFR BLD CALC: 45.3 % (ref 37–47)
HEMOGLOBIN: 16 GM/DL (ref 12.5–16)
IMMATURE NEUTROPHIL %: 0.3 % (ref 0–0.43)
LYMPHOCYTES ABSOLUTE: 1.6 K/CU MM
LYMPHOCYTES RELATIVE PERCENT: 15 % (ref 24–44)
MCH RBC QN AUTO: 28.7 PG (ref 27–31)
MCHC RBC AUTO-ENTMCNC: 35.3 % (ref 32–36)
MCV RBC AUTO: 81.2 FL (ref 78–100)
MONOCYTES ABSOLUTE: 1 K/CU MM
MONOCYTES RELATIVE PERCENT: 9.1 % (ref 0–4)
PDW BLD-RTO: 13.1 % (ref 11.7–14.9)
PLATELET # BLD: 182 K/CU MM (ref 140–440)
PMV BLD AUTO: 10.7 FL (ref 7.5–11.1)
POTASSIUM SERPL-SCNC: 3.2 MMOL/L (ref 3.5–5.1)
RBC # BLD: 5.58 M/CU MM (ref 4.2–5.4)
SEGMENTED NEUTROPHILS ABSOLUTE COUNT: 7.9 K/CU MM
SEGMENTED NEUTROPHILS RELATIVE PERCENT: 73.9 % (ref 36–66)
SODIUM BLD-SCNC: 132 MMOL/L (ref 135–145)
TOTAL IMMATURE NEUTOROPHIL: 0.03 K/CU MM
WBC # BLD: 10.6 K/CU MM (ref 4–10.5)

## 2023-11-05 PROCEDURE — 96365 THER/PROPH/DIAG IV INF INIT: CPT

## 2023-11-05 PROCEDURE — 96375 TX/PRO/DX INJ NEW DRUG ADDON: CPT

## 2023-11-05 PROCEDURE — 99284 EMERGENCY DEPT VISIT MOD MDM: CPT

## 2023-11-05 PROCEDURE — 10060 I&D ABSCESS SIMPLE/SINGLE: CPT

## 2023-11-05 PROCEDURE — 6370000000 HC RX 637 (ALT 250 FOR IP): Performed by: EMERGENCY MEDICINE

## 2023-11-05 PROCEDURE — 80048 BASIC METABOLIC PNL TOTAL CA: CPT

## 2023-11-05 PROCEDURE — 2580000003 HC RX 258: Performed by: EMERGENCY MEDICINE

## 2023-11-05 PROCEDURE — 85025 COMPLETE CBC W/AUTO DIFF WBC: CPT

## 2023-11-05 PROCEDURE — 6360000002 HC RX W HCPCS: Performed by: EMERGENCY MEDICINE

## 2023-11-05 RX ORDER — KETOROLAC TROMETHAMINE 15 MG/ML
15 INJECTION, SOLUTION INTRAMUSCULAR; INTRAVENOUS ONCE
Status: COMPLETED | OUTPATIENT
Start: 2023-11-05 | End: 2023-11-05

## 2023-11-05 RX ORDER — POTASSIUM CHLORIDE 20 MEQ/1
20 TABLET, EXTENDED RELEASE ORAL ONCE
Status: COMPLETED | OUTPATIENT
Start: 2023-11-05 | End: 2023-11-05

## 2023-11-05 RX ORDER — SULFAMETHOXAZOLE AND TRIMETHOPRIM 800; 160 MG/1; MG/1
TABLET ORAL
COMMUNITY
Start: 2023-11-04

## 2023-11-05 RX ORDER — 0.9 % SODIUM CHLORIDE 0.9 %
1000 INTRAVENOUS SOLUTION INTRAVENOUS ONCE
Status: COMPLETED | OUTPATIENT
Start: 2023-11-05 | End: 2023-11-05

## 2023-11-05 RX ORDER — ONDANSETRON 4 MG/1
4 TABLET, ORALLY DISINTEGRATING ORAL 3 TIMES DAILY PRN
Qty: 21 TABLET | Refills: 0 | Status: SHIPPED | OUTPATIENT
Start: 2023-11-05

## 2023-11-05 RX ORDER — LIDOCAINE HYDROCHLORIDE 40 MG/ML
SOLUTION TOPICAL ONCE
Status: COMPLETED | OUTPATIENT
Start: 2023-11-05 | End: 2023-11-05

## 2023-11-05 RX ADMIN — BACITRACIN ZINC: 500 OINTMENT TOPICAL at 00:20

## 2023-11-05 RX ADMIN — KETOROLAC TROMETHAMINE 15 MG: 15 INJECTION, SOLUTION INTRAMUSCULAR; INTRAVENOUS at 16:14

## 2023-11-05 RX ADMIN — CEPHALEXIN 500 MG: 500 CAPSULE ORAL at 00:20

## 2023-11-05 RX ADMIN — LIDOCAINE HYDROCHLORIDE: 40 SOLUTION ORAL at 17:14

## 2023-11-05 RX ADMIN — CEFAZOLIN 2000 MG: 2 INJECTION, POWDER, FOR SOLUTION INTRAMUSCULAR; INTRAVENOUS at 16:16

## 2023-11-05 RX ADMIN — POTASSIUM CHLORIDE 20 MEQ: 1500 TABLET, EXTENDED RELEASE ORAL at 17:17

## 2023-11-05 RX ADMIN — SODIUM CHLORIDE 1000 ML: 9 INJECTION, SOLUTION INTRAVENOUS at 16:14

## 2023-11-05 ASSESSMENT — PAIN - FUNCTIONAL ASSESSMENT
PAIN_FUNCTIONAL_ASSESSMENT: PREVENTS OR INTERFERES SOME ACTIVE ACTIVITIES AND ADLS
PAIN_FUNCTIONAL_ASSESSMENT: 0-10

## 2023-11-05 ASSESSMENT — LIFESTYLE VARIABLES
HOW OFTEN DO YOU HAVE A DRINK CONTAINING ALCOHOL: MONTHLY OR LESS
HOW MANY STANDARD DRINKS CONTAINING ALCOHOL DO YOU HAVE ON A TYPICAL DAY: 1 OR 2

## 2023-11-05 ASSESSMENT — PAIN DESCRIPTION - PAIN TYPE: TYPE: ACUTE PAIN

## 2023-11-05 ASSESSMENT — PAIN SCALES - GENERAL: PAINLEVEL_OUTOF10: 5

## 2023-11-05 ASSESSMENT — PAIN DESCRIPTION - LOCATION: LOCATION: HEAD

## 2023-11-05 ASSESSMENT — PAIN DESCRIPTION - FREQUENCY: FREQUENCY: CONTINUOUS

## 2023-11-05 ASSESSMENT — PAIN DESCRIPTION - DESCRIPTORS: DESCRIPTORS: SHARP;THROBBING

## 2023-11-05 NOTE — DISCHARGE INSTRUCTIONS
Please add cephalexin to your antibiotic regimen. If you develop any worsening or concerning symptoms, please seek immediate medical attention.

## 2023-11-05 NOTE — ED NOTES
Discharge instructions reviewed and pt acknowledges understanding. Ambulatory at discharge.        Sia Mena RN  11/05/23 9946

## 2023-11-05 NOTE — ED PROVIDER NOTES
MD  Authorized by: Samara Tomlin MD    Consent:     Consent obtained:  Verbal  Universal protocol:     Patient identity confirmed:  Verbally with patient  Location:     Type:  Abscess    Size:  1cm  Sedation:     Sedation type:  None  Anesthesia:     Anesthesia method:  Local infiltration    Local anesthetic:  Lidocaine 1% w/o epi  Procedure type:     Complexity:  Simple  Procedure details:     Ultrasound guidance: no      Incision types:  Single straight    Wound management:  Irrigated with saline    Drainage:  Serosanguinous    Drainage amount:  Scant    Wound treatment:  Wound left open  Post-procedure details:     Procedure completion:  Tolerated well, no immediate complications          FINAL IMPRESSION      1. Abscess    2. Cellulitis of head except face          DISPOSITION/PLAN   DISPOSITION Decision To Discharge 11/05/2023 12:25:06 AM      PATIENT REFERRED TO:  RICKY Mcneill - CNP  [facility]  [id number]  [facility] [address]  [phone]    Schedule an appointment as soon as possible for a visit in 1 week        DISCHARGE MEDICATIONS:  Discharge Medication List as of 11/5/2023 12:18 AM        START taking these medications    Details   cephALEXin (KEFLEX) 500 MG capsule Take 1 capsule by mouth 4 times daily for 10 days, Disp-40 capsule, R-0Normal             ED Provider Disposition Time  DISPOSITION Decision To Discharge 11/05/2023 12:25:06 AM      Appropriate personal protective equipment was worn during the patient's evaluation. These included surgical, eye protection, surgical mask or in 95 respirator and gloves. The patient was also placed in a surgical mask for the prevention of possible spread of respiratory viral illnesses. The Patient was instructed to read the package inserts with any medication that was prescribed. Major potential reactions and medication interactions were discussed.   The Patient understands that there are numerous possible adverse reactions not

## 2023-11-16 ENCOUNTER — INITIAL CONSULT (OUTPATIENT)
Age: 43
End: 2023-11-16

## 2023-11-16 ENCOUNTER — HOSPITAL ENCOUNTER (OUTPATIENT)
Age: 43
Setting detail: SPECIMEN
Discharge: HOME | End: 2023-11-16

## 2023-11-16 VITALS
SYSTOLIC BLOOD PRESSURE: 117 MMHG | DIASTOLIC BLOOD PRESSURE: 81 MMHG | HEIGHT: 60 IN | BODY MASS INDEX: 31.02 KG/M2 | WEIGHT: 158 LBS

## 2023-11-16 DIAGNOSIS — R87.810 CERVICAL HIGH RISK HPV (HUMAN PAPILLOMAVIRUS) TEST POSITIVE: ICD-10-CM

## 2023-11-16 DIAGNOSIS — R87.612 LGSIL ON PAP SMEAR OF CERVIX: Primary | ICD-10-CM

## 2023-11-16 SDOH — ECONOMIC STABILITY: FOOD INSECURITY: WITHIN THE PAST 12 MONTHS, YOU WORRIED THAT YOUR FOOD WOULD RUN OUT BEFORE YOU GOT MONEY TO BUY MORE.: NEVER TRUE

## 2023-11-16 SDOH — ECONOMIC STABILITY: INCOME INSECURITY: HOW HARD IS IT FOR YOU TO PAY FOR THE VERY BASICS LIKE FOOD, HOUSING, MEDICAL CARE, AND HEATING?: SOMEWHAT HARD

## 2023-11-16 SDOH — ECONOMIC STABILITY: FOOD INSECURITY: WITHIN THE PAST 12 MONTHS, THE FOOD YOU BOUGHT JUST DIDN'T LAST AND YOU DIDN'T HAVE MONEY TO GET MORE.: NEVER TRUE

## 2023-11-16 SDOH — ECONOMIC STABILITY: HOUSING INSECURITY
IN THE LAST 12 MONTHS, WAS THERE A TIME WHEN YOU DID NOT HAVE A STEADY PLACE TO SLEEP OR SLEPT IN A SHELTER (INCLUDING NOW)?: NO

## 2023-11-16 ASSESSMENT — PATIENT HEALTH QUESTIONNAIRE - PHQ9
SUM OF ALL RESPONSES TO PHQ QUESTIONS 1-9: 0
1. LITTLE INTEREST OR PLEASURE IN DOING THINGS: 0
SUM OF ALL RESPONSES TO PHQ QUESTIONS 1-9: 0
2. FEELING DOWN, DEPRESSED OR HOPELESS: 0
SUM OF ALL RESPONSES TO PHQ9 QUESTIONS 1 & 2: 0
SUM OF ALL RESPONSES TO PHQ QUESTIONS 1-9: 0
SUM OF ALL RESPONSES TO PHQ QUESTIONS 1-9: 0

## 2023-11-30 ENCOUNTER — TELEPHONE (OUTPATIENT)
Dept: OBGYN | Age: 43
End: 2023-11-30

## 2023-12-07 ENCOUNTER — INITIAL CONSULT (OUTPATIENT)
Dept: CARDIOLOGY CLINIC | Age: 43
End: 2023-12-07
Payer: COMMERCIAL

## 2023-12-07 VITALS
SYSTOLIC BLOOD PRESSURE: 120 MMHG | HEIGHT: 60 IN | WEIGHT: 156 LBS | BODY MASS INDEX: 30.63 KG/M2 | RESPIRATION RATE: 16 BRPM | DIASTOLIC BLOOD PRESSURE: 88 MMHG | HEART RATE: 84 BPM

## 2023-12-07 DIAGNOSIS — R01.1 HEART MURMUR: Primary | ICD-10-CM

## 2023-12-07 DIAGNOSIS — I10 ESSENTIAL HYPERTENSION: ICD-10-CM

## 2023-12-07 DIAGNOSIS — E66.09 CLASS 1 OBESITY DUE TO EXCESS CALORIES WITH SERIOUS COMORBIDITY AND BODY MASS INDEX (BMI) OF 30.0 TO 30.9 IN ADULT: ICD-10-CM

## 2023-12-07 PROCEDURE — 99203 OFFICE O/P NEW LOW 30 MIN: CPT | Performed by: INTERNAL MEDICINE

## 2023-12-07 PROCEDURE — 3079F DIAST BP 80-89 MM HG: CPT | Performed by: INTERNAL MEDICINE

## 2023-12-07 PROCEDURE — 93000 ELECTROCARDIOGRAM COMPLETE: CPT | Performed by: INTERNAL MEDICINE

## 2023-12-07 PROCEDURE — 3074F SYST BP LT 130 MM HG: CPT | Performed by: INTERNAL MEDICINE

## 2023-12-07 NOTE — PROGRESS NOTES
Teagan Oreilly MD                                  CARDIOLOGY  NOTE           Chief Complaint:    Chief Complaint   Patient presents with    Hypertension    Diabetes     Pt denies any new cardiac concerns. HPI:     Jasmin Harper is a 39y.o. year old female who presents to the clinic with chief complaint of cardiac murmur as incidentally discovered during physical examination at PCPs office. Patient denies any particular chest pain shortness of breath or palpitation. Denies any PND orthopnea or edema. EKG shows sinus rhythm without any ischemic changes, no prior infarcts. Normal intervals. Patient denies any prior history of any history of valvular heart disease, denies history of rheumatic heart fever. No history of congestive heart failure CAD or arrhythmias    Denies any smoking alcohol or drug abuse  Patient has prior medical history significant for obesity hypertension hyperlipidemia and diabetes mellitus.         Current Outpatient Medications   Medication Sig Dispense Refill    ondansetron (ZOFRAN-ODT) 4 MG disintegrating tablet Take 1 tablet by mouth 3 times daily as needed for Nausea or Vomiting 21 tablet 0    ziprasidone (GEODON) 20 MG capsule Take 1 capsule by mouth 2 times daily (with meals)      acetaminophen (AMINOFEN) 325 MG tablet Take 2 tablets by mouth every 6 hours as needed for Pain 30 tablet 0    ibuprofen (ADVIL;MOTRIN) 600 MG tablet Take 1 tablet by mouth 3 times daily as needed for Pain 30 tablet 0    ARIPiprazole (ABILIFY) 5 MG tablet TAKE 1 TABLET BY MOUTH ONCE DAILY AT BEDTIME      cetirizine (ZYRTEC) 10 MG tablet TAKE 1 TABLET BY MOUTH ONCE DAILY      doxepin (SINEQUAN) 50 MG capsule Take 1 capsule by mouth 2 times daily      escitalopram (LEXAPRO) 10 MG tablet Take 1 tablet by mouth daily      glimepiride (AMARYL) 2 MG tablet TAKE 1 TABLET BY MOUTH TWICE DAILY      hydrOXYzine (ATARAX) 50 MG tablet TAKE 2 TABLETS BY MOUTH AT BEDTIME      lisinopril (PRINIVIL;ZESTRIL) 20

## 2024-01-23 ENCOUNTER — TELEPHONE (OUTPATIENT)
Dept: CARDIOLOGY CLINIC | Age: 44
End: 2024-01-23

## 2024-01-23 NOTE — TELEPHONE ENCOUNTER
Called to patient by leaving a message with the results of recent testing echo - EF of 50 - 55%. Aortic Valve: Trileaflet valve. Mild sclerosis of the aortic valve cusp. Mild stenosis of the aortic valve. Mitral Valve: Mild regurgitation. Tricuspid Valve: Normal RVSP.

## 2024-06-29 ENCOUNTER — HOSPITAL ENCOUNTER (EMERGENCY)
Age: 44
Discharge: HOME | End: 2024-06-29
Attending: EMERGENCY MEDICINE
Payer: COMMERCIAL

## 2024-06-29 VITALS
BODY MASS INDEX: 30.43 KG/M2 | SYSTOLIC BLOOD PRESSURE: 142 MMHG | WEIGHT: 155 LBS | HEART RATE: 95 BPM | OXYGEN SATURATION: 98 % | DIASTOLIC BLOOD PRESSURE: 100 MMHG | HEIGHT: 60 IN | TEMPERATURE: 98.1 F | RESPIRATION RATE: 16 BRPM

## 2024-06-29 DIAGNOSIS — N76.4 LABIAL ABSCESS: Primary | ICD-10-CM

## 2024-06-29 PROCEDURE — 99283 EMERGENCY DEPT VISIT LOW MDM: CPT

## 2024-06-29 PROCEDURE — 6370000000 HC RX 637 (ALT 250 FOR IP): Performed by: EMERGENCY MEDICINE

## 2024-06-29 RX ORDER — METRONIDAZOLE 500 MG/1
500 TABLET ORAL ONCE
Status: COMPLETED | OUTPATIENT
Start: 2024-06-29 | End: 2024-06-29

## 2024-06-29 RX ORDER — NAPROXEN 500 MG/1
500 TABLET ORAL 2 TIMES DAILY WITH MEALS
Qty: 14 TABLET | Refills: 0 | Status: SHIPPED | OUTPATIENT
Start: 2024-06-29 | End: 2024-07-06

## 2024-06-29 RX ORDER — METRONIDAZOLE 500 MG/1
500 TABLET ORAL 3 TIMES DAILY
Qty: 21 TABLET | Refills: 0 | Status: SHIPPED | OUTPATIENT
Start: 2024-06-29 | End: 2024-07-06

## 2024-06-29 RX ADMIN — METRONIDAZOLE 500 MG: 500 TABLET ORAL at 19:51

## 2024-06-29 ASSESSMENT — PAIN DESCRIPTION - LOCATION: LOCATION: VAGINA

## 2024-06-29 ASSESSMENT — PAIN - FUNCTIONAL ASSESSMENT
PAIN_FUNCTIONAL_ASSESSMENT: 0-10
PAIN_FUNCTIONAL_ASSESSMENT: ACTIVITIES ARE NOT PREVENTED

## 2024-06-29 ASSESSMENT — PAIN DESCRIPTION - ONSET: ONSET: ON-GOING

## 2024-06-29 ASSESSMENT — PAIN DESCRIPTION - PAIN TYPE: TYPE: ACUTE PAIN

## 2024-06-29 ASSESSMENT — PAIN DESCRIPTION - DESCRIPTORS: DESCRIPTORS: BURNING;OTHER (COMMENT)

## 2024-06-29 ASSESSMENT — PAIN DESCRIPTION - FREQUENCY: FREQUENCY: CONTINUOUS

## 2024-06-29 ASSESSMENT — PAIN SCALES - GENERAL: PAINLEVEL_OUTOF10: 3

## 2024-06-29 NOTE — ED PROVIDER NOTES
Triage Chief Complaint:    Cyst (Pt states she has a cyst on her left vaginal labia- onset 6 days ago. States she saw her PCP 3 days ago for this and was placed on Bactrium and Ibuprofen. She states some of the cyst drained 3 nights ago and continues to drain and now there is an opening after she \"pulled something off of it\". Denies fever, cold chills, vomiting. + Intermittent nausea.)    JASPREET Morton is a 44 y.o. female that presents for evaluation of drainage from a left labial cyst.  Patient reports that she saw her primary care doctor about 3 days ago placed on Bactrim ibuprofen and then since then it has been draining and the opening is still there.  She wanted to get this evaluated.  She has denied any pain elsewhere.  No fevers or chills.  No urinary symptoms.  No change to bowel habits.  She is been compliant with her antibiotic    History from : Patient    Limitations to history : None    ROS:  10 systems reviewed and negative except as above.     Past Medical History:   Diagnosis Date    Abnormal Pap smear of cervix 10/13/23    Bipolar affective ([facility])     Full dentures     Full upper denture--partial lower denture    H/O echocardiogram 01/18/2024    EF of 50 - 55%. Aortic Valve: Trileaflet valve. Mild sclerosis of the aortic valve cusp. Mild stenosis of the aortic valve. Mitral Valve: Mild regurgitation. Tricuspid Valve: Normal RVSP.    History of blood transfusion 05/01/2015    Anemia from prolonged menses    Hypertension     Type 2 diabetes mellitus without complication ([facility])     Type II or unspecified type diabetes mellitus without mention of complication, not stated as uncontrolled      Past Surgical History:   Procedure Laterality Date    DENTAL SURGERY  01/01/2007    Most of teeth extracted    DILATION AND CURETTAGE      DILATION AND CURETTAGE OF UTERUS  02/01/2015    HERNIA REPAIR  12/22/2015    Umbilical    UMBILICAL HERNIA REPAIR       Family History   Problem Relation Age of Onset     spontaneously and she is down to 89 now.  The patient had no crepitus or fluctuance to suggest need for emergent imaging or general surgery evaluation.  Low clinical solution for deep space infection including necrotizing fasciitis/Anuj's.  Plan will be to have her follow-up outpatient with OB/GYN as well as return here with any change or worsening.  She was agreeable with plan of care provided.    Is this patient to be included in the SEP-1 core measure due to severe sepsis or septic shock? No Exclusion criteria - the patient is NOT to be included for SEP-1 Core Measure due to: 2+ SIRS criteria are not met     Disposition: Discharged     I am the primary physician of record    Clinical Impression:  1. Labial abscess      Disposition referral (if applicable):  Jordan Javier MD  [address] [id number] [phone]    Schedule an appointment as soon as possible for a visit       Raine Baez APRN - [facility]  [address]  [id number]  [place] [id number] [phone]    Schedule an appointment as soon as possible for a visit       [facility]  [address] [address]  [phone]    If symptoms worsen    Disposition medications (if applicable):  New Prescriptions    METRONIDAZOLE (FLAGYL) 500 MG TABLET    Take 1 tablet by mouth 3 times daily for 7 days    NAPROXEN (NAPROSYN) 500 MG TABLET    Take 1 tablet by mouth 2 times daily (with meals) for 7 days       Comment: Please note this report has been produced using speech recognition software and may contain errors related to that system including errors in grammar, punctuation, and spelling, as well as words and phrases that may be inappropriate. If there are any questions or concerns please feel free to contact the dictating provider for clarification.       Dash Fletcher MD  06/29/24 [id number]